# Patient Record
Sex: FEMALE | Race: BLACK OR AFRICAN AMERICAN | Employment: FULL TIME | URBAN - METROPOLITAN AREA
[De-identification: names, ages, dates, MRNs, and addresses within clinical notes are randomized per-mention and may not be internally consistent; named-entity substitution may affect disease eponyms.]

---

## 2019-10-18 ENCOUNTER — HOSPITAL ENCOUNTER (OUTPATIENT)
Dept: NON INVASIVE DIAGNOSTICS | Age: 69
Discharge: HOME | End: 2019-10-18
Attending: INTERNAL MEDICINE
Payer: COMMERCIAL

## 2019-10-18 VITALS
HEIGHT: 66 IN | SYSTOLIC BLOOD PRESSURE: 179 MMHG | WEIGHT: 279 LBS | BODY MASS INDEX: 44.84 KG/M2 | DIASTOLIC BLOOD PRESSURE: 102 MMHG

## 2019-10-18 DIAGNOSIS — I47.1 PAROXYSMAL SUPRAVENTRICULAR TACHYCARDIA (HCC): ICD-10-CM

## 2019-10-18 LAB
ECHO AO ASC DIAM: 2.88 CM
ECHO AO ROOT DIAM: 3.25 CM
ECHO AV AREA PEAK VELOCITY: 1.9 CM2
ECHO AV AREA VTI: 1.9 CM2
ECHO AV AREA/BSA PEAK VELOCITY: 0.8 CM2/M2
ECHO AV AREA/BSA VTI: 0.8 CM2/M2
ECHO AV MEAN GRADIENT: 4.4 MMHG
ECHO AV PEAK GRADIENT: 7.7 MMHG
ECHO AV PEAK VELOCITY: 139 CM/S
ECHO AV VTI: 27.71 CM
ECHO IVC SNIFF: 1.4 CM
ECHO LA MAJOR AXIS: 5 CM
ECHO LA VOL 2C: 106.72 ML (ref 22–52)
ECHO LA VOL 4C: 63.64 ML (ref 22–52)
ECHO LA VOL BP: 90.22 ML (ref 22–52)
ECHO LA VOL/BSA BIPLANE: 39.16 ML/M2 (ref 16–28)
ECHO LA VOLUME INDEX A2C: 46.32 ML/M2 (ref 16–28)
ECHO LA VOLUME INDEX A4C: 27.62 ML/M2 (ref 16–28)
ECHO LV E' LATERAL VELOCITY: 10 CM/S
ECHO LV E' SEPTAL VELOCITY: 5 CM/S
ECHO LV EDV A2C: 98.8 ML
ECHO LV EDV A4C: 131.6 ML
ECHO LV EDV BP: 116.4 ML (ref 56–104)
ECHO LV EDV INDEX A4C: 57.1 ML/M2
ECHO LV EDV INDEX BP: 50.5 ML/M2
ECHO LV EDV NDEX A2C: 42.9 ML/M2
ECHO LV EJECTION FRACTION A2C: 25 %
ECHO LV EJECTION FRACTION A4C: 26 %
ECHO LV EJECTION FRACTION BIPLANE: 25.5 % (ref 55–100)
ECHO LV ESV A2C: 74.3 ML
ECHO LV ESV A4C: 96.9 ML
ECHO LV ESV BP: 86.7 ML (ref 19–49)
ECHO LV ESV INDEX A2C: 32.2 ML/M2
ECHO LV ESV INDEX A4C: 42.1 ML/M2
ECHO LV ESV INDEX BP: 37.6 ML/M2
ECHO LV INTERNAL DIMENSION DIASTOLIC: 4.93 CM (ref 3.9–5.3)
ECHO LV INTERNAL DIMENSION SYSTOLIC: 4.82 CM
ECHO LV IVSD: 1.22 CM (ref 0.6–0.9)
ECHO LV MASS 2D: 296.4 G (ref 67–162)
ECHO LV MASS INDEX 2D: 128.6 G/M2 (ref 43–95)
ECHO LV POSTERIOR WALL DIASTOLIC: 1.32 CM (ref 0.6–0.9)
ECHO LVOT DIAM: 1.98 CM
ECHO LVOT PEAK GRADIENT: 3 MMHG
ECHO LVOT PEAK VELOCITY: 87.01 CM/S
ECHO LVOT VTI: 17.39 CM
ECHO MV A VELOCITY: 84.15 CM/S
ECHO MV AREA PHT: 4.3 CM2
ECHO MV E DECELERATION TIME (DT): 174.9 MS
ECHO MV E VELOCITY: 83.13 CM/S
ECHO MV E/A RATIO: 0.99
ECHO MV E/E' LATERAL: 8.31
ECHO MV E/E' RATIO (AVERAGED): 12.47
ECHO MV E/E' SEPTAL: 16.63
ECHO MV PRESSURE HALF TIME (PHT): 50.7 MS
ECHO RA AREA 4C: 16.82 CM2
ECHO RV INTERNAL DIMENSION: 4.52 CM
ECHO TRICUSPID ANNULAR PEAK SYSTOLIC VELOCITY: 2.6 CM/S
ECHO TV REGURGITANT MAX VELOCITY: 297.41 CM/S
ECHO TV REGURGITANT PEAK GRADIENT: 35.4 MMHG

## 2019-10-18 PROCEDURE — 93306 TTE W/DOPPLER COMPLETE: CPT

## 2020-01-22 RX ORDER — ASPIRIN 81 MG/1
81 TABLET ORAL DAILY
COMMUNITY

## 2020-01-22 RX ORDER — METOPROLOL SUCCINATE 25 MG/1
25 TABLET, EXTENDED RELEASE ORAL DAILY
COMMUNITY

## 2020-01-22 RX ORDER — SIMVASTATIN 20 MG/1
20 TABLET, FILM COATED ORAL
COMMUNITY

## 2020-01-22 RX ORDER — LOSARTAN POTASSIUM 25 MG/1
25 TABLET ORAL DAILY
COMMUNITY

## 2020-01-23 ENCOUNTER — HOSPITAL ENCOUNTER (OUTPATIENT)
Age: 70
Setting detail: OUTPATIENT SURGERY
Discharge: HOME | End: 2020-01-23
Attending: INTERNAL MEDICINE | Admitting: INTERNAL MEDICINE
Payer: COMMERCIAL

## 2020-01-23 VITALS
SYSTOLIC BLOOD PRESSURE: 124 MMHG | WEIGHT: 274.56 LBS | OXYGEN SATURATION: 100 % | HEART RATE: 63 BPM | HEIGHT: 65 IN | RESPIRATION RATE: 11 BRPM | DIASTOLIC BLOOD PRESSURE: 62 MMHG | TEMPERATURE: 97.4 F | BODY MASS INDEX: 45.74 KG/M2

## 2020-01-23 DIAGNOSIS — R94.39 ABNORMAL STRESS TEST: ICD-10-CM

## 2020-01-23 DIAGNOSIS — I20.9 ANGINA PECTORIS (HCC): ICD-10-CM

## 2020-01-23 LAB
ALBUMIN SERPL-MCNC: 4 G/DL (ref 3.4–5)
ALBUMIN/GLOB SERPL: 1.1 {RATIO} (ref 0.8–1.7)
ALP SERPL-CCNC: 78 U/L (ref 45–117)
ALT SERPL-CCNC: 18 U/L (ref 13–56)
ANION GAP SERPL CALC-SCNC: 6 MMOL/L (ref 3–18)
AST SERPL-CCNC: 17 U/L (ref 10–38)
ATRIAL RATE: 68 BPM
BASOPHILS # BLD: 0 K/UL (ref 0–0.1)
BASOPHILS NFR BLD: 0 % (ref 0–3)
BILIRUB SERPL-MCNC: 0.7 MG/DL (ref 0.2–1)
BUN SERPL-MCNC: 11 MG/DL (ref 7–18)
BUN/CREAT SERPL: 13 (ref 12–20)
CALCIUM SERPL-MCNC: 9.2 MG/DL (ref 8.5–10.1)
CALCULATED P AXIS, ECG09: 52 DEGREES
CALCULATED R AXIS, ECG10: -30 DEGREES
CALCULATED T AXIS, ECG11: 16 DEGREES
CHLORIDE SERPL-SCNC: 108 MMOL/L (ref 100–111)
CHOLEST SERPL-MCNC: 200 MG/DL
CO2 SERPL-SCNC: 29 MMOL/L (ref 21–32)
CREAT SERPL-MCNC: 0.84 MG/DL (ref 0.6–1.3)
DIAGNOSIS, 93000: NORMAL
DIFFERENTIAL METHOD BLD: ABNORMAL
EOSINOPHIL # BLD: 0.3 K/UL (ref 0–0.4)
EOSINOPHIL NFR BLD: 3 % (ref 0–5)
ERYTHROCYTE [DISTWIDTH] IN BLOOD BY AUTOMATED COUNT: 14.8 % (ref 11.6–14.5)
GLOBULIN SER CALC-MCNC: 3.7 G/DL (ref 2–4)
GLUCOSE SERPL-MCNC: 96 MG/DL (ref 74–99)
HCT VFR BLD AUTO: 43.5 % (ref 35–45)
HDLC SERPL-MCNC: 89 MG/DL (ref 40–60)
HDLC SERPL: 2.2 {RATIO} (ref 0–5)
HGB BLD-MCNC: 13.7 G/DL (ref 12–16)
INR PPP: 1.2 (ref 0.8–1.2)
LDLC SERPL CALC-MCNC: 95.8 MG/DL (ref 0–100)
LIPID PROFILE,FLP: ABNORMAL
LYMPHOCYTES # BLD: 2.5 K/UL (ref 0.8–3.5)
LYMPHOCYTES NFR BLD: 28 % (ref 20–51)
MAGNESIUM SERPL-MCNC: 2.2 MG/DL (ref 1.6–2.6)
MCH RBC QN AUTO: 27.6 PG (ref 24–34)
MCHC RBC AUTO-ENTMCNC: 31.5 G/DL (ref 31–37)
MCV RBC AUTO: 87.7 FL (ref 74–97)
MONOCYTES # BLD: 0.4 K/UL (ref 0–1)
MONOCYTES NFR BLD: 5 % (ref 2–9)
NEUTS SEG # BLD: 5.7 K/UL (ref 1.8–8)
NEUTS SEG NFR BLD: 64 % (ref 42–75)
P-R INTERVAL, ECG05: 164 MS
PLATELET # BLD AUTO: 280 K/UL (ref 135–420)
PLATELET COMMENTS,PCOM: ABNORMAL
PMV BLD AUTO: 11.3 FL (ref 9.2–11.8)
POTASSIUM SERPL-SCNC: 4 MMOL/L (ref 3.5–5.5)
PROT SERPL-MCNC: 7.7 G/DL (ref 6.4–8.2)
PROTHROMBIN TIME: 15 SEC (ref 11.5–15.2)
Q-T INTERVAL, ECG07: 410 MS
QRS DURATION, ECG06: 110 MS
QTC CALCULATION (BEZET), ECG08: 435 MS
RBC # BLD AUTO: 4.96 M/UL (ref 4.2–5.3)
RBC MORPH BLD: ABNORMAL
SODIUM SERPL-SCNC: 143 MMOL/L (ref 136–145)
TRIGL SERPL-MCNC: 76 MG/DL (ref ?–150)
VENTRICULAR RATE, ECG03: 68 BPM
VLDLC SERPL CALC-MCNC: 15.2 MG/DL
WBC # BLD AUTO: 8.9 K/UL (ref 4.6–13.2)
WBC MORPH BLD: ABNORMAL

## 2020-01-23 PROCEDURE — 77030004522 HC CATH ANGI DX EXPO BSC -A: Performed by: INTERNAL MEDICINE

## 2020-01-23 PROCEDURE — C1894 INTRO/SHEATH, NON-LASER: HCPCS | Performed by: INTERNAL MEDICINE

## 2020-01-23 PROCEDURE — 93005 ELECTROCARDIOGRAM TRACING: CPT

## 2020-01-23 PROCEDURE — 80061 LIPID PANEL: CPT

## 2020-01-23 PROCEDURE — C1887 CATHETER, GUIDING: HCPCS | Performed by: INTERNAL MEDICINE

## 2020-01-23 PROCEDURE — 83735 ASSAY OF MAGNESIUM: CPT

## 2020-01-23 PROCEDURE — C1769 GUIDE WIRE: HCPCS | Performed by: INTERNAL MEDICINE

## 2020-01-23 PROCEDURE — 80053 COMPREHEN METABOLIC PANEL: CPT

## 2020-01-23 PROCEDURE — 85610 PROTHROMBIN TIME: CPT

## 2020-01-23 PROCEDURE — 77030029997 HC DEV COM RDL R BND TELE -B: Performed by: INTERNAL MEDICINE

## 2020-01-23 PROCEDURE — 77030013797 HC KT TRNSDUC PRSSR EDWD -A: Performed by: INTERNAL MEDICINE

## 2020-01-23 PROCEDURE — 85025 COMPLETE CBC W/AUTO DIFF WBC: CPT

## 2020-01-23 PROCEDURE — 99152 MOD SED SAME PHYS/QHP 5/>YRS: CPT | Performed by: INTERNAL MEDICINE

## 2020-01-23 PROCEDURE — 77030012468 HC VLV BLEEDBK CNTRL ABBT -B: Performed by: INTERNAL MEDICINE

## 2020-01-23 PROCEDURE — 77030004521 HC CATH ANGI DX COOK -B: Performed by: INTERNAL MEDICINE

## 2020-01-23 PROCEDURE — 74011250636 HC RX REV CODE- 250/636: Performed by: INTERNAL MEDICINE

## 2020-01-23 PROCEDURE — 36415 COLL VENOUS BLD VENIPUNCTURE: CPT

## 2020-01-23 PROCEDURE — 93460 R&L HRT ART/VENTRICLE ANGIO: CPT | Performed by: INTERNAL MEDICINE

## 2020-01-23 PROCEDURE — 74011000250 HC RX REV CODE- 250: Performed by: INTERNAL MEDICINE

## 2020-01-23 PROCEDURE — 77030008543 HC TBNG MON PRSS MRTM -A: Performed by: INTERNAL MEDICINE

## 2020-01-23 PROCEDURE — 99153 MOD SED SAME PHYS/QHP EA: CPT | Performed by: INTERNAL MEDICINE

## 2020-01-23 PROCEDURE — C1751 CATH, INF, PER/CENT/MIDLINE: HCPCS | Performed by: INTERNAL MEDICINE

## 2020-01-23 PROCEDURE — 74011636320 HC RX REV CODE- 636/320: Performed by: INTERNAL MEDICINE

## 2020-01-23 PROCEDURE — 77030016699 HC CATH ANGI DX INFN1 CARD -A: Performed by: INTERNAL MEDICINE

## 2020-01-23 RX ORDER — MIDAZOLAM HYDROCHLORIDE 1 MG/ML
INJECTION, SOLUTION INTRAMUSCULAR; INTRAVENOUS AS NEEDED
Status: DISCONTINUED | OUTPATIENT
Start: 2020-01-23 | End: 2020-01-23 | Stop reason: HOSPADM

## 2020-01-23 RX ORDER — LIDOCAINE HYDROCHLORIDE 10 MG/ML
INJECTION INFILTRATION; PERINEURAL AS NEEDED
Status: DISCONTINUED | OUTPATIENT
Start: 2020-01-23 | End: 2020-01-23 | Stop reason: HOSPADM

## 2020-01-23 RX ORDER — GUAIFENESIN 100 MG/5ML
81 LIQUID (ML) ORAL
Status: DISCONTINUED | OUTPATIENT
Start: 2020-01-23 | End: 2020-01-23 | Stop reason: HOSPADM

## 2020-01-23 RX ORDER — SODIUM CHLORIDE 9 MG/ML
25 INJECTION, SOLUTION INTRAVENOUS CONTINUOUS
Status: DISCONTINUED | OUTPATIENT
Start: 2020-01-23 | End: 2020-01-23 | Stop reason: HOSPADM

## 2020-01-23 RX ORDER — FENTANYL CITRATE 50 UG/ML
INJECTION, SOLUTION INTRAMUSCULAR; INTRAVENOUS AS NEEDED
Status: DISCONTINUED | OUTPATIENT
Start: 2020-01-23 | End: 2020-01-23 | Stop reason: HOSPADM

## 2020-01-23 RX ORDER — SODIUM CHLORIDE 0.9 % (FLUSH) 0.9 %
5-40 SYRINGE (ML) INJECTION EVERY 8 HOURS
Status: DISCONTINUED | OUTPATIENT
Start: 2020-01-23 | End: 2020-01-23 | Stop reason: HOSPADM

## 2020-01-23 RX ORDER — HEPARIN SODIUM 1000 [USP'U]/ML
INJECTION, SOLUTION INTRAVENOUS; SUBCUTANEOUS AS NEEDED
Status: DISCONTINUED | OUTPATIENT
Start: 2020-01-23 | End: 2020-01-23 | Stop reason: HOSPADM

## 2020-01-23 RX ORDER — SODIUM CHLORIDE 0.9 % (FLUSH) 0.9 %
5-40 SYRINGE (ML) INJECTION AS NEEDED
Status: DISCONTINUED | OUTPATIENT
Start: 2020-01-23 | End: 2020-01-23 | Stop reason: HOSPADM

## 2020-01-23 RX ORDER — HEPARIN SODIUM 200 [USP'U]/100ML
INJECTION, SOLUTION INTRAVENOUS
Status: COMPLETED | OUTPATIENT
Start: 2020-01-23 | End: 2020-01-23

## 2020-01-23 RX ORDER — VERAPAMIL HYDROCHLORIDE 2.5 MG/ML
INJECTION, SOLUTION INTRAVENOUS AS NEEDED
Status: DISCONTINUED | OUTPATIENT
Start: 2020-01-23 | End: 2020-01-23 | Stop reason: HOSPADM

## 2020-01-23 RX ADMIN — SODIUM CHLORIDE 25 ML/HR: 900 INJECTION, SOLUTION INTRAVENOUS at 08:18

## 2020-01-23 NOTE — PROGRESS NOTES
Sheath removed from left groin area,  2x2 and tegaderm applied to site,  Tr band removed from left wrist area, site covered with 2x2 and Tegaderm  .   Neuro/vasc assessment of left arm and hand WNL

## 2020-01-23 NOTE — PROGRESS NOTES
1/23/2020      RE: Veronica Casas      To Whom it May Concern: This is to certify that Veronica Casas may return to work Monday 1/27/2020      Please feel free to contact my office if you have any questions or concerns. Thank you for your assistance in this matter. Sincerely,      Dr Lore ivory.  Arnold Andrew RN

## 2020-01-23 NOTE — Clinical Note
TRANSFER - OUT REPORT:     Verbal report given to: Tiffany Joyce. Report consisted of patient's Situation, Background, Assessment and   Recommendations(SBAR). Opportunity for questions and clarification was provided. Patient transported to: care unit.

## 2020-01-23 NOTE — PROGRESS NOTES
Cardiology Progress Note        Patient: Adriana Coe        Sex: female          DOA: 1/23/2020  YOB: 1950      Age:  81 y.o.        LOS:  LOS: 0 days   Assessment/Plan   Date of Surgery Update:  Adriana Coe was seen and examined. History and physical has been reviewed. The patient has been examined.  There have been no significant clinical changes since the completion of the originally dated History and Physical.    Signed By: Surnider Atkinson MD     January 23, 2020 9:51 AM             Signed By: Surinder Atkinson MD     January 23, 2020

## 2020-01-23 NOTE — PROGRESS NOTES
Pt up dressed self. To bathroom and voided without incident,  Discharge inst given and reviewed with pt and daughter, both verbalize understanding. Discharged via wheelchair to car in care of daughter,  Denies any pain or distress at time of discharge, arm bands removed and shredded.

## 2020-01-23 NOTE — DISCHARGE INSTRUCTIONS
DISCHARGE SUMMARY from Nurse    PATIENT INSTRUCTIONS:    After general anesthesia or intravenous sedation, for 24 hours or while taking prescription Narcotics:  · Limit your activities  · Do not drive and operate hazardous machinery  · Do not make important personal or business decisions  · Do  not drink alcoholic beverages  · If you have not urinated within 8 hours after discharge, please contact your surgeon on call. Report the following to your surgeon:  · Excessive pain, swelling, redness or odor of or around the surgical area  · Temperature over 100.5  · Nausea and vomiting lasting longer than 4 hours or if unable to take medications  · Any signs of decreased circulation or nerve impairment to extremity: change in color, persistent  numbness, tingling, coldness or increase pain  · Any questions    What to do at Home:  Recommended activity: Activity as tolerated and no driving for today,     *  Please give a list of your current medications to your Primary Care Provider. *  Please update this list whenever your medications are discontinued, doses are      changed, or new medications (including over-the-counter products) are added. *  Please carry medication information at all times in case of emergency situations. These are general instructions for a healthy lifestyle:    No smoking/ No tobacco products/ Avoid exposure to second hand smoke  Surgeon General's Warning:  Quitting smoking now greatly reduces serious risk to your health. Obesity, smoking, and sedentary lifestyle greatly increases your risk for illness    A healthy diet, regular physical exercise & weight monitoring are important for maintaining a healthy lifestyle    You may be retaining fluid if you have a history of heart failure or if you experience any of the following symptoms:  Weight gain of 3 pounds or more overnight or 5 pounds in a week, increased swelling in our hands or feet or shortness of breath while lying flat in bed. Please call your doctor as soon as you notice any of these symptoms; do not wait until your next office visit. The discharge information has been reviewed with the patient. The patient verbalized understanding. Discharge medications reviewed with the patient and appropriate educational materials and side effects teaching were provided. ___________________________________________________________________________________________________________________________________                 Cardiac Catheterization/Angiography Discharge Instructions    *Check the puncture site frequently for swelling or bleeding. If you see any bleeding, lie down and apply pressure over the area with a clean towel or washcloth. Notify your doctor for any redness, swelling, drainage or oozing from the puncture site. Notify your doctor for any fever or chills. *If the leg or arm with the puncture becomes cold, numb or painful,  Go to the emergency room. *Activity should be limited for the next 24 hours. Climb stairs as little as possible and avoid any stooping, bending or strenuous activity for 24 hours. No heavy lifting (anything over 10 pounds) for five days. *Do not drive for 24 hours. *You may resume your usual diet. Drink more fluids than usual.    *Have a responsible person drive you home and stay with you for at least 24 hours after your heart catheterization/angiography. *You may remove the bandage from your Right Groin and arm in 24 hours. You may shower in 24 hours. No tub baths, hot tubs or swimming for one week. Do not place any lotions, creams, powders, ointments over the puncture site for one week. You may place a clean band-aid over the puncture site each day for 5 days. Change this daily.     May return to work Monday 1/27/2020

## 2020-01-23 NOTE — ROUTINE PROCESS
Cardiac Cath Lab:  Pre Procedure Chart Check Patients chart was accessed and reviewed for possible and/or scheduled procedure. Creatinine Clearance: 
Serum creatinine: 0.84 mg/dL 01/23/20 0900 Estimated creatinine clearance: 85.5 mL/min Total Contrast  Load: 
3 x estimated clearance amount=  256.5ml 
 
75% of Contrast Load: 0.75 x Total Contrast Load=    192.3ml Recent Labs  
  01/23/20 
0900 01/23/20 
0815 WBC  --  8.9  
RBC  --  4.96  
HCT  --  43.5 HGB  --  13.7 PLT  --  280 INR  --  1.2 PTP  --  15.0   --   
K 4.0  --   
BUN 11  --   
CREA 0.84  --   
GFRAA >60  --   
GFRNA >60  --   
CA 9.2  --   
 
 
BMI: Body mass index is 46.4 kg/m². ALLERGIES:  
Allergies Allergen Reactions  Pcn [Penicillins] Hives Lines: 
  
  
Peripheral IV 01/23/20 Left;Posterior Hand (Active) Site Assessment Clean, dry, & intact 1/23/2020  8:19 AM  
Phlebitis Assessment 0 1/23/2020  8:19 AM  
Dressing Status Clean, dry, & intact 1/23/2020  8:19 AM  
Dressing Type Transparent 1/23/2020  8:19 AM  
Hub Color/Line Status Blue 1/23/2020  8:19 AM  
 
  
 
History: 
 
Past Medical History:  
Diagnosis Date  CAD (coronary artery disease)   
 cardiomophathy  CAD (coronary artery disease)   
 svt  Cancer [facility])   
 colon adenoma  Cancer ([facility] [address] lympoma  Ill-defined condition   
 pulmonary hypertension  Liver disease   
 pulmonary hypertension Past Surgical History:  
Procedure Laterality Date  HX GYN There is no problem list on file for this patient.

## 2020-01-23 NOTE — Clinical Note
TRANSFER - IN REPORT:     Verbal report received from: rn.     Report consisted of patient's Situation, Background, Assessment and   Recommendations(SBAR). Opportunity for questions and clarification was provided. Assessment completed upon patient's arrival to unit and care assumed. Patient transported with a Cardiac Cath Tech / Patient Care Tech.

## 2021-12-10 RX ORDER — SPIRONOLACTONE 25 MG/1
25 TABLET ORAL DAILY
COMMUNITY

## 2021-12-10 RX ORDER — FUROSEMIDE 40 MG/1
40 TABLET ORAL DAILY
COMMUNITY

## 2021-12-13 ENCOUNTER — HOSPITAL ENCOUNTER (OUTPATIENT)
Age: 71
Setting detail: OUTPATIENT SURGERY
Discharge: HOME | End: 2021-12-13
Attending: INTERNAL MEDICINE | Admitting: INTERNAL MEDICINE
Payer: MEDICARE

## 2021-12-13 VITALS
TEMPERATURE: 98.6 F | HEART RATE: 83 BPM | WEIGHT: 256.4 LBS | OXYGEN SATURATION: 100 % | RESPIRATION RATE: 16 BRPM | HEIGHT: 66 IN | DIASTOLIC BLOOD PRESSURE: 71 MMHG | SYSTOLIC BLOOD PRESSURE: 127 MMHG | BODY MASS INDEX: 41.21 KG/M2

## 2021-12-13 DIAGNOSIS — I27.20 PULMONARY HYPERTENSION (HCC): ICD-10-CM

## 2021-12-13 DIAGNOSIS — I50.9 CONGESTIVE HEART FAILURE, UNSPECIFIED HF CHRONICITY, UNSPECIFIED HEART FAILURE TYPE (HCC): ICD-10-CM

## 2021-12-13 DIAGNOSIS — I42.9 CARDIOMYOPATHY, UNSPECIFIED TYPE (HCC): ICD-10-CM

## 2021-12-13 LAB
ATRIAL RATE: 96 BPM
CALCULATED P AXIS, ECG09: 47 DEGREES
CALCULATED R AXIS, ECG10: 14 DEGREES
CALCULATED T AXIS, ECG11: 10 DEGREES
DIAGNOSIS, 93000: NORMAL
ERYTHROCYTE [DISTWIDTH] IN BLOOD BY AUTOMATED COUNT: 20.3 % (ref 11.6–14.5)
HCT VFR BLD AUTO: 40.8 % (ref 35–45)
HGB BLD-MCNC: 12.4 G/DL (ref 12–16)
INR PPP: 1.3 (ref 0.8–1.2)
MCH RBC QN AUTO: 24.6 PG (ref 24–34)
MCHC RBC AUTO-ENTMCNC: 30.4 G/DL (ref 31–37)
MCV RBC AUTO: 81 FL (ref 78–100)
NRBC # BLD: 0 K/UL (ref 0–0.01)
NRBC BLD-RTO: 0 PER 100 WBC
P-R INTERVAL, ECG05: 176 MS
PLATELET # BLD AUTO: 247 K/UL (ref 135–420)
PMV BLD AUTO: 10.7 FL (ref 9.2–11.8)
PROTHROMBIN TIME: 15.8 SEC (ref 11.5–15.2)
Q-T INTERVAL, ECG07: 392 MS
QRS DURATION, ECG06: 128 MS
QTC CALCULATION (BEZET), ECG08: 495 MS
RBC # BLD AUTO: 5.04 M/UL (ref 4.2–5.3)
VENTRICULAR RATE, ECG03: 96 BPM
WBC # BLD AUTO: 8 K/UL (ref 4.6–13.2)

## 2021-12-13 PROCEDURE — 85027 COMPLETE CBC AUTOMATED: CPT

## 2021-12-13 PROCEDURE — 93460 R&L HRT ART/VENTRICLE ANGIO: CPT | Performed by: INTERNAL MEDICINE

## 2021-12-13 PROCEDURE — 74011250637 HC RX REV CODE- 250/637: Performed by: INTERNAL MEDICINE

## 2021-12-13 PROCEDURE — 77030016699 HC CATH ANGI DX INFN1 CARD -A: Performed by: INTERNAL MEDICINE

## 2021-12-13 PROCEDURE — 77030008543 HC TBNG MON PRSS MRTM -A: Performed by: INTERNAL MEDICINE

## 2021-12-13 PROCEDURE — 77030004514: Performed by: INTERNAL MEDICINE

## 2021-12-13 PROCEDURE — 77030013797 HC KT TRNSDUC PRSSR EDWD -A: Performed by: INTERNAL MEDICINE

## 2021-12-13 PROCEDURE — 74011250636 HC RX REV CODE- 250/636: Performed by: INTERNAL MEDICINE

## 2021-12-13 PROCEDURE — 85610 PROTHROMBIN TIME: CPT

## 2021-12-13 PROCEDURE — 99152 MOD SED SAME PHYS/QHP 5/>YRS: CPT | Performed by: INTERNAL MEDICINE

## 2021-12-13 PROCEDURE — 74011000250 HC RX REV CODE- 250: Performed by: INTERNAL MEDICINE

## 2021-12-13 PROCEDURE — C1769 GUIDE WIRE: HCPCS | Performed by: INTERNAL MEDICINE

## 2021-12-13 PROCEDURE — 93005 ELECTROCARDIOGRAM TRACING: CPT

## 2021-12-13 PROCEDURE — C1894 INTRO/SHEATH, NON-LASER: HCPCS | Performed by: INTERNAL MEDICINE

## 2021-12-13 PROCEDURE — 77030004522 HC CATH ANGI DX EXPO BSC -A: Performed by: INTERNAL MEDICINE

## 2021-12-13 PROCEDURE — 74011000636 HC RX REV CODE- 636: Performed by: INTERNAL MEDICINE

## 2021-12-13 PROCEDURE — C1751 CATH, INF, PER/CENT/MIDLINE: HCPCS | Performed by: INTERNAL MEDICINE

## 2021-12-13 PROCEDURE — 77030004566 HC CATH ANGI DX TORCON COOK -B: Performed by: INTERNAL MEDICINE

## 2021-12-13 PROCEDURE — 99153 MOD SED SAME PHYS/QHP EA: CPT | Performed by: INTERNAL MEDICINE

## 2021-12-13 RX ORDER — LIDOCAINE HYDROCHLORIDE 10 MG/ML
INJECTION INFILTRATION; PERINEURAL AS NEEDED
Status: DISCONTINUED | OUTPATIENT
Start: 2021-12-13 | End: 2021-12-13 | Stop reason: HOSPADM

## 2021-12-13 RX ORDER — FENTANYL CITRATE 50 UG/ML
INJECTION, SOLUTION INTRAMUSCULAR; INTRAVENOUS AS NEEDED
Status: DISCONTINUED | OUTPATIENT
Start: 2021-12-13 | End: 2021-12-13 | Stop reason: HOSPADM

## 2021-12-13 RX ORDER — MIDAZOLAM HYDROCHLORIDE 1 MG/ML
INJECTION, SOLUTION INTRAMUSCULAR; INTRAVENOUS AS NEEDED
Status: DISCONTINUED | OUTPATIENT
Start: 2021-12-13 | End: 2021-12-13 | Stop reason: HOSPADM

## 2021-12-13 RX ORDER — HEPARIN SODIUM 1000 [USP'U]/ML
INJECTION, SOLUTION INTRAVENOUS; SUBCUTANEOUS AS NEEDED
Status: DISCONTINUED | OUTPATIENT
Start: 2021-12-13 | End: 2021-12-13 | Stop reason: HOSPADM

## 2021-12-13 RX ORDER — SODIUM CHLORIDE 0.9 % (FLUSH) 0.9 %
5-40 SYRINGE (ML) INJECTION EVERY 8 HOURS
Status: CANCELLED | OUTPATIENT
Start: 2021-12-13

## 2021-12-13 RX ORDER — GUAIFENESIN 100 MG/5ML
81 LIQUID (ML) ORAL
Status: DISCONTINUED | OUTPATIENT
Start: 2021-12-13 | End: 2021-12-13

## 2021-12-13 RX ORDER — HEPARIN SODIUM 200 [USP'U]/100ML
INJECTION, SOLUTION INTRAVENOUS
Status: COMPLETED | OUTPATIENT
Start: 2021-12-13 | End: 2021-12-13

## 2021-12-13 RX ORDER — VERAPAMIL HYDROCHLORIDE 2.5 MG/ML
INJECTION, SOLUTION INTRAVENOUS AS NEEDED
Status: DISCONTINUED | OUTPATIENT
Start: 2021-12-13 | End: 2021-12-13 | Stop reason: HOSPADM

## 2021-12-13 RX ORDER — THERA TABS 400 MCG
1 TAB ORAL DAILY
COMMUNITY

## 2021-12-13 RX ORDER — GUAIFENESIN 100 MG/5ML
81 LIQUID (ML) ORAL
Status: COMPLETED | OUTPATIENT
Start: 2021-12-13 | End: 2021-12-13

## 2021-12-13 RX ORDER — SODIUM CHLORIDE 0.9 % (FLUSH) 0.9 %
5-40 SYRINGE (ML) INJECTION AS NEEDED
Status: CANCELLED | OUTPATIENT
Start: 2021-12-13

## 2021-12-13 RX ORDER — SODIUM CHLORIDE 9 MG/ML
10 INJECTION, SOLUTION INTRAVENOUS ONCE
Status: COMPLETED | OUTPATIENT
Start: 2021-12-13 | End: 2021-12-13

## 2021-12-13 RX ADMIN — ASPIRIN 81 MG: 81 TABLET, CHEWABLE ORAL at 08:34

## 2021-12-13 RX ADMIN — SODIUM CHLORIDE 10 ML/HR: 9 INJECTION, SOLUTION INTRAVENOUS at 08:22

## 2021-12-13 NOTE — BRIEF OP NOTE
Brief Postoperative Note    Patient: Adwoa Fountain  YOB: 1950  MRN: [id number]    Date of Procedure: 12/13/2021     Pre-Op Diagnosis: Congestive heart failure, unspecified HF chronicity, unspecified heart failure type ([address] [I50.9]  Cardiomyopathy, unspecified type ([address] [I42.9]  Pulmonary hypertension ([address] [I27.20]    Post-Op Diagnosis: Same as preoperative diagnosis. Procedure(s):  LEFT AND RIGHT HEART CATH / CORONARY ANGIOGRAPHY    Surgeon(s):  Jeanne Heard MD    Surgical Assistant: None    Anesthesia: Con-Sed     Estimated Blood Loss (mL): less than 50     Complications: None    Specimens: * No specimens in log *     Implants: * No implants in log *    Drains: * No LDAs found *    Findings: Mild luminal irregularities in the coronary arteries otherwise patent. Elevated right-sided pressure, pulmonary hypertension, group 2. Complete report to follow.   Discussed with patient and family    Electronically Signed by Terrence Terrazas MD on 12/13/2021 at 11:22 AM

## 2021-12-13 NOTE — PROGRESS NOTES
[address] December 13, 2021       RE: Zuhair Romeo      To Whom It May Concern,    This is to certify that Zuhair Romeo may may return to work on Wednesday 12/15/21. Please feel free to contact Dr. Andres Self office at [phone] if you have any questions or concerns. Thank you for your assistance in this matter.       Sincerely,        Eduin Ovalles RN

## 2021-12-13 NOTE — DISCHARGE INSTRUCTIONS
DISCHARGE SUMMARY from Nurse    PATIENT INSTRUCTIONS:    After general anesthesia or intravenous sedation, for 24 hours or while taking prescription Narcotics:  · Limit your activities  · Do not drive and operate hazardous machinery  · Do not make important personal or business decisions  · Do  not drink alcoholic beverages  · If you have not urinated within 8 hours after discharge, please contact your surgeon on call. Report the following to your surgeon:  · Excessive pain, swelling, redness or odor of or around the surgical area  · Temperature over 100.5  · Nausea and vomiting lasting longer than 4 hours or if unable to take medications  · Any signs of decreased circulation or nerve impairment to extremity: change in color, persistent  numbness, tingling, coldness or increase pain  · Any questions    What to do at Home:  Recommended activity: No lifting, Driving, or Strenuous exercise for 3 days      *  Please give a list of your current medications to your Primary Care Provider. *  Please update this list whenever your medications are discontinued, doses are      changed, or new medications (including over-the-counter products) are added. *  Please carry medication information at all times in case of emergency situations. These are general instructions for a healthy lifestyle:    No smoking/ No tobacco products/ Avoid exposure to second hand smoke  Surgeon General's Warning:  Quitting smoking now greatly reduces serious risk to your health.     Obesity, smoking, and sedentary lifestyle greatly increases your risk for illness    A healthy diet, regular physical exercise & weight monitoring are important for maintaining a healthy lifestyle    You may be retaining fluid if you have a history of heart failure or if you experience any of the following symptoms:  Weight gain of 3 pounds or more overnight or 5 pounds in a week, increased swelling in our hands or feet or shortness of breath while lying flat in bed. Please call your doctor as soon as you notice any of these symptoms; do not wait until your next office visit. The discharge information has been reviewed with the patient and caregiver. The patient and caregiver verbalized understanding. Discharge medications reviewed with the patient and caregiver and appropriate educational materials and side effects teaching were provided. ___________________________________________________________________________________________________________________________________                 Cardiac Catheterization/Angiography Discharge Instructions    *Check the puncture site frequently for swelling or bleeding. If you see any bleeding, lie down and apply pressure over the area with a clean town or washcloth. Notify your doctor for any redness, swelling, drainage or oozing from the puncture site. Notify your doctor for any fever or chills. *If the leg or arm with the puncture becomes cold, numb or painful, call Dr Leonidas Grissom at  [phone]    *Activity should be limited for the next 36 hours. Climb stairs as little as possible and avoid any stooping, bending or strenuous activity for 48 hours. No heavy lifting (anything over 10 pounds) for three days. *Do not drive for 36 hours. *You may resume your usual diet. Drink more fluids than usual.    *Have a responsible person drive you home and stay with you for at least 24 hours after your heart catheterization/angiography. *You may remove the bandage from your Right, Left and Arm in 24 hours. You may shower in 24 hours. No tub baths, hot tubs or swimming for one week. Do not place any lotions, creams, powders, ointments over the puncture site for one week. You may place a clean band-aid over the puncture site each day for 5 days. Change this daily.       Patient armband removed and shredded

## 2021-12-13 NOTE — Clinical Note
Contrast Dose Calculator:   Patient's age: 73.   Patient's sex: Female. Patient weight (kg) = 116.3. Creatinine level (mg/dL) = 0.94. Creatinine clearance (mL/min): 104. Max Contrast dose per Creatinine Cl calculator = 234 mL.

## 2021-12-13 NOTE — PROGRESS NOTES
Pt prepped and ready for procedure. 0945 - EKG done    0950 - To Cath lab for R and L heart cath. 1122 - Returned to room post cath. Report received. TR Band to R radial in place. TR Band to Left Radial in place. Sheath in place in R brachial vein. All sites without signs of complications. Pt educated on calling for bleeding, numbness and not to put pressure on either arm. Taking PO fluids. 1215 - eating box lunch. Tolerating well.    1300 - began releasing TR Bands per protocol. Right brachial sheath removed. Pressure held x 10 minutes. Dressed with gauze and tegaderm. No signs of complications. 1345 - Right TR Band release completed. Site without signs of complications. Explained that the armboard would need to stay in place for 24 hours. Instructed on care of the sites. 1400 - Left TR Band release complete. Site without signs of complications. 1500 - ambulated to BR to void. Adelso well. Helped pt get dressed. 1515 - Discharge instructions reviewed with pt. And her daughter. Denies c/o pain. Dressings to all 3 puncture sites D/I without signs of complications. Discharged to home in stable condition with daughter. Left via WC.

## 2021-12-13 NOTE — PROGRESS NOTES
Cardiology Progress Note        Patient: Mikki Bañuelos        Sex: female          DOA: 12/13/2021  YOB: 1950      Age:  73 y.o.        LOS:  LOS: 0 days   Assessment/Plan   Date of Surgery Update:  Mikki Bañuelos was seen and examined. History and physical has been reviewed. The patient has been examined.  There have been no significant clinical changes since the completion of the originally dated History and Physical.    Signed By: Ingrid Blackmon MD     December 13, 2021 9:57 AM

## 2021-12-13 NOTE — Clinical Note
TRANSFER - IN REPORT:     Verbal report received from: rn.     Report consisted of patient's Situation, Background, Assessment and   Recommendations(SBAR). Opportunity for questions and clarification was provided. Assessment completed upon patient's arrival to unit and care assumed. Patient transported with a Registered Nurse and [address] [place] [address] / [facility] [place].

## 2022-12-03 RX ORDER — SODIUM CHLORIDE 0.9 % (FLUSH) 0.9 %
5-40 SYRINGE (ML) INJECTION AS NEEDED
Status: CANCELLED | OUTPATIENT
Start: 2022-12-03

## 2022-12-03 RX ORDER — ATROPINE SULFATE 0.1 MG/ML
0.5 INJECTION INTRAVENOUS
Status: CANCELLED | OUTPATIENT
Start: 2022-12-03 | End: 2022-12-04

## 2022-12-03 RX ORDER — DEXTROMETHORPHAN/PSEUDOEPHED 2.5-7.5/.8
1.2 DROPS ORAL
Status: CANCELLED | OUTPATIENT
Start: 2022-12-03

## 2022-12-03 RX ORDER — SODIUM CHLORIDE 0.9 % (FLUSH) 0.9 %
5-40 SYRINGE (ML) INJECTION EVERY 8 HOURS
Status: CANCELLED | OUTPATIENT
Start: 2022-12-03

## 2022-12-03 RX ORDER — DIPHENHYDRAMINE HYDROCHLORIDE 50 MG/ML
50 INJECTION, SOLUTION INTRAMUSCULAR; INTRAVENOUS ONCE
Status: CANCELLED | OUTPATIENT
Start: 2022-12-03 | End: 2022-12-03

## 2022-12-03 RX ORDER — EPINEPHRINE 0.1 MG/ML
1 INJECTION INTRACARDIAC; INTRAVENOUS
Status: CANCELLED | OUTPATIENT
Start: 2022-12-03 | End: 2022-12-04

## 2022-12-03 NOTE — H&P
Assessment/Plan  # Detail Type Description    1. Assessment Personal history of colonic polyps (Z86.010). Impression Pt of Dr. Agustina Jalloh, here for 3yr Recall, for surveillance of colonic adenomas, last addressed in 2019.  _________________________________  *Last colonoscopy was done on 4/23/2019 by Dr. Teddy Hill, history of adenomas: 1) One cecal polyp removed with snare (Tubular Adenoma). 2) One ascending polyp removed with snare (Tubular Adenoma). 3) One transverse colon polyp removed with cold biopsy (Tubular Adenoma). 4) Diverticulosis. 5) Internal hemorrhoids. 3yr Recall recommended. *C-scope History (of records that we have on file):  2006, hx of polyps - Dr. Fischer Cutting: Negative exam, 7yr Recall  2016, hx of polyps - Dr. Perry Macias: 2x Tubular Adenomas, 3yr Recall  2019, hx of adenomas - Dr. Perry Macias: 2x Tubular Adenomas, 1x HP, 3yr Recall     -Some additional old records unavailable at this time, any records recovered will be scanned to chart at later date.  _________________________________  Average risk, no FHx of colon cancer. Asymptomatic of GI complaints. BM: 2/day. Patient Plan *C-scope Plan:  Colonoscopy ordered with Dr. Denita Wyman  (@[facility] d/t: BMI of 44.95, Advancing Age, comorbidities)with PlenVu bowel prep (kit provided), and Miralax and stool softeners starting 3 days before prep.  -Patient is advised that they should take their aspirin (if prescribed) up until the day of procedure.  -Patient is advised to take Thyroid meds, BP meds, beta blockers, and any cardiac meds the AM of procedure with sip clear liquid after prep. *C-scope Risks:  Stressed importance of following all bowel preparation instructions. Explained the procedure to the patient including all risks and benefits. These risks consist of missed lesions on exam, bleeding, and bowel perforation with possible need for admission to the hospital, and in the most extensive of  circumstances, the patient may require surgery.  Pt verbalized understanding of these risks and is agreeable with this procedure. Plan Orders Further diagnostic evaluations ordered today include(s) DIAGNOSTIC COLONOSCOPY to be performed. She will be scheduled for GASTROENTEROLOGY PROCEDURE, Next Lab Date is within 1 Month on 12/06/2022. Clinical information/comments: at location [facility]. The surgeon scheduled is Anna Cardoso MD. An assistant has not been requested. 2. Assessment Nonischemic cardiomyopathy (I42.8). Impression Hx of Cardiomyopathy - Stable from symptom standpoint w/Decreased EF on Echos. Echo on 11/11/2022, EF: 25-30% (Improving), EF dropped to 15-20% in November 2021. Followed by Dr. Jurgen Ribeiro (Cardiologist), recommended AICD placement (patient wanted to wait on this). Hx of SVT, Pulmonary HTN, and Combined CHF - Euvolemic  Fluid restriction of 60oz/day  Taking Lasix, Aldactone, and Metoprolol. 3. Assessment Chronic combined systolic and diastolic congestive heart failure, NYHA Class I (I50.42). Impression See Above. 4. Assessment Supraventricular tachycardia (I47.1). Impression See Above. 5. Assessment Pulmonary hypertension, unspecified (I27.20). Impression Followed by Dr. John Bustos (Lung & Sleep) for this and JUSTINO (on CPAP), last seen on 11/4/2022.         6. Assessment Body mass index (BMI) 45.0-49.9, adult (Q34.93). Impression BMI: 44.95, Pt is Morbidly obese w/Short Frame, Hx of JUSTINO (on CPAP), and Pulmonary HTN. These factors increase risks of procedural complications with sedation, and compromise airway maintenance. Special attention to airway management in a hospital setting. Patient Plan The patient's significant comorbidities make for a high endoscopic risk group. [facility] setting required for all GI endoscopic procedures. This 81year old  patient was referred by Thad Lang. This 81year old female presents for Hx polyp/colon cancer.     History of Present Illness  1. Hx polyp/colon cancer   Prior screening:  colonoscopy. Denies risk factors. Pertinent negatives include abdominal pain, change in bowel habits, constipation, decreased appetite, diarrhea, melena, nausea, vomiting and weight loss. Additional information: No family history of colon cancer and last colonoscopy 2019   [facility] [place]  Hx adenoma  3 yrs recall . Bm 2 x daily. 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   Postmenopausal age: 42. Type of menopause is chemo induced .       Family History   (Detailed)    Relationship Family Member Name  Age at Death Condition Onset Age Cause of Death   Father  N  Stroke 76 N   Father    Kidney failure  N   Maternal grandmother  N  Myocardial infarction 43 N   Mother  Y 66 Small Cell lung cancer  Y   Mother  Y    N   Mother    Gout  N   Mother    Hypertension  N   Mother  N  Hyperlipidemia  N   Family History Comments  Relationship Family Member Name Condition Comments   Maternal grandmother  Myocardial infarction    Mother Hyperlipidemia      Social History  (Detailed)  Tobacco use reviewed. Preferred language is [place]. Marital Status/Family/Social Support  Marital status: Single     Tobacco use status: Never smoked tobacco.    Smoking status: Never smoker. Tobacco Screening  Patient has never used tobacco. Patient has not used tobacco in the last 30 days. Patient has not used smokeless tobacco in the last 30 days. Smoking Status  Type Smoking Status Usage Per Day Years Used Pack Years Total Pack Years    Never smoker         Alcohol  There is no history of alcohol use. Caffeine  The patient uses caffeine: coffee and tea. .    Lifestyle  Sedentary activity level. Never exercises. Diet  high calorie. Medications (active prior to today)  Medication Instructions Start Date Stop Date Refilled Elsewhere   Aspirin Low Dose 81 mg tablet,delayed release take 1 tablet by oral route  every day 10/21/2019   N   spironolactone 25 mg tablet take 1 tablet by oral route  every day 2022 N   metoprolol succinate ER 25 mg tablet,extended release 24 hr take 1 tablet by oral route  every day 2022 N   losartan 25 mg tablet take 1 tablet by oral route  every day 2022 N   FUROSEMIDE 40 MG TABLET take 2 tablets by mouth once daily AND MAY REPEAT LATER IN THE DAY AS NEEDED 2022 N   SIMVASTATIN 20 MG TABLET take 1 tablet by mouth every evening 10/25/2022  10/25/2022 N       Medication Reconciliation  Medications reconciled today.     Medication Reviewed  Adherence Medication Name Sig Desc Elsewhere Status   taking as directed Aspirin Low Dose 81 mg tablet,delayed release take 1 tablet by oral route  every day N Verified   taking as directed metoprolol succinate ER 25 mg tablet,extended release 24 hr take 1 tablet by oral route  every day N Verified   taking as directed spironolactone 25 mg tablet take 1 tablet by oral route  every day N Verified   taking as directed FUROSEMIDE 40 MG TABLET take 2 tablets by mouth once daily AND MAY REPEAT LATER IN THE DAY AS NEEDED N Verified   taking as directed losartan 25 mg tablet take 1 tablet by oral route  every day N Verified   taking as directed SIMVASTATIN 20 MG TABLET take 1 tablet by mouth every evening N Verified     Medications (Added, Continued or Stopped today)  Start Date Medication Directions PRN Status PRN Reason Instruction Stop Date   10/21/2019 Aspirin Low Dose 81 mg tablet,delayed release take 1 tablet by oral route  every day N      06/20/2022 FUROSEMIDE 40 MG TABLET take 2 tablets by mouth once daily AND MAY REPEAT LATER IN THE DAY AS NEEDED N      05/20/2022 losartan 25 mg tablet take 1 tablet by oral route  every day N      04/13/2022 metoprolol succinate ER 25 mg tablet,extended release 24 hr take 1 tablet by oral route  every day N      10/25/2022 SIMVASTATIN 20 MG TABLET take 1 tablet by mouth every evening N      04/13/2022 spironolactone 25 mg tablet take 1 tablet by oral route  every day N        Allergies  Ingredient Reaction (Severity) Medication Name Comment   PENICILLINS Rash       Reviewed, no changes. Review of Systems  System Neg/Pos Details   Constitutional Negative Chills, Fever, Malaise and Weight loss. ENMT Negative Ear infections, Nasal congestion, Sinus Infection and Sore throat. Eyes Negative Double vision and Eye pain. Respiratory Negative Asthma, Chronic cough, Dyspnea, Pleuritic pain and Wheezing. Cardio Negative Chest pain, Edema and Irregular heartbeat/palpitations. GI Negative Abdominal pain, Change in bowel habits, Constipation, Decreased appetite, Diarrhea, Dysphagia, Heartburn, Hematemesis, Hematochezia, Melena, Nausea, Reflux and Vomiting.  Negative Dysuria, Hematuria, Urinary frequency, Urinary incontinence and Urinary retention.    Endocrine Negative Cold intolerance, Heat intolerance and Increased thirst.   Neuro Negative Dizziness, Headache, Numbness, Tremors and Vertigo. Psych Negative Anxiety, Depression and Increased stress. Integumentary Negative Hives, Pruritus and Rash. MS Negative Back pain, Joint pain and Myalgia. Hema/Lymph Negative Easy bleeding, Easy bruising and Lymphadenopathy. Reproductive Positive The patient is post-menopausal (Occurred at age 42. The menopause was chemo induced). Reproductive Negative Breast lumps, Breast pain and Vaginal discharge. Vital Signs   Gynecologic History  Patient is postmenopausal.      Height  Time ft in cm Last Measured Height Position   8:22 AM 5.0 4.50 163.83 11/15/2022 0   8:19 AM    11/15/2022      Weight/BSA/BMI  Time lb oz kg Context BMI kg/m2 BSA m2   8:22 .00  120.656  44.95    8:19 AM    dressed with shoes       Date/Time Temp Pulse BP Cardiac Rhythm [facility]   12/06/22 0945 97.6 °F (36.4 °C) 78 140/69 Abnormal  -- CS     Respiratory Therapy (last day)    Date/Time Resp SpO2 O2 Device O2 Flow Rate (L/min) [facility]   12/06/22 0945 16 100 % None (Room air) -- CS     Physical  Exam  Exam Findings Details   Female GI Quick Visit Comments Morbidly obese w/Short Frame   Constitutional Normal Well developed. Eyes Normal Conjunctiva - Right: Normal, Left: Normal. Sclera - Right: Normal, Left: Normal.   Nasopharynx Normal Lips/teeth/gums - Normal.   Neck Exam Normal Inspection - Normal.   Respiratory Normal Inspection - Normal.   Cardiovascular Normal Regular rate and rhythm. No murmurs, gallops, or rubs. Vascular Normal Pulses - Brachial: Normal.   Abdomen * Obese. Skin Normal Inspection - Normal.   Musculoskeletal Normal Hands/Wrist - Right: Normal, Left: Normal.   Extremity Normal No edema. Neurological Normal Fine motor skills - Normal.   Psychiatric Normal Orientation - Oriented to time, place, person & situation. Appropriate mood and affect. Patient Education  # Patient Education   1.  Colon Polyps: Care Instructions       Immunizations Entered by History  Date Immunization   4/17/2021 12:00:00 AM SARS-COV-2 (COVID-19) vaccine, mRNA, spike protein, LNP, preservative free, 30 mcg/0.3mL dose   3/26/2021 12:00:00 AM SARS-COV-2 (COVID-19) vaccine, mRNA, spike protein, LNP, preservative free, 30 mcg/0.3mL dose   10/10/2020 12:00:00 AM Influenza, injectable, quadrivalent, preservative free   10/12/2019 8:54:46 AM Influenza, injectable, quadrivalent, preservative free   3/23/2019 12:00:00 AM Shingrix   10/29/2018 12:00:00 AM Flu (3 yrs or older)   3/23/2019 12:00:00 AM Zostavax   1/21/2019 12:00:00 AM zoster recombinant   10/7/2017 12:00:00 AM Flu (3 yrs or older)   10/8/2016 10:03:19 AM Flu (3 yrs or older)   10/1/2015 12:00:00 AM Flu (3 yrs or older)   10/6/2014 12:00:00 AM Flu (3 yrs or older)   10/21/2013 12:00:00 AM Zoster   10/21/2013 12:00:00 AM pneumo (2 yrs or older) (PPV23)       Active Patient Care Team Members  Name Contact Agency Type Support Role Relationship Active Date Inactive Date Specialty   Jacob Green   Patient provider PCP   [address]   encounter provider    Gastroenterology   Fitz Mott   encounter provider    Pulmonary Medicine   Jasmine Brandon   encounter provider    Dietician   Patient questioned and examined

## 2022-12-06 ENCOUNTER — HOSPITAL ENCOUNTER (OUTPATIENT)
Age: 72
Setting detail: OUTPATIENT SURGERY
Discharge: HOME | End: 2022-12-06
Attending: INTERNAL MEDICINE | Admitting: INTERNAL MEDICINE
Payer: MEDICARE

## 2022-12-06 VITALS
DIASTOLIC BLOOD PRESSURE: 66 MMHG | OXYGEN SATURATION: 98 % | HEIGHT: 65 IN | SYSTOLIC BLOOD PRESSURE: 138 MMHG | BODY MASS INDEX: 43.57 KG/M2 | TEMPERATURE: 97.5 F | WEIGHT: 261.5 LBS | HEART RATE: 70 BPM | RESPIRATION RATE: 16 BRPM

## 2022-12-06 PROCEDURE — 77030040361 HC SLV COMPR DVT MDII -B: Performed by: INTERNAL MEDICINE

## 2022-12-06 PROCEDURE — 74011250636 HC RX REV CODE- 250/636: Performed by: INTERNAL MEDICINE

## 2022-12-06 PROCEDURE — 99153 MOD SED SAME PHYS/QHP EA: CPT | Performed by: INTERNAL MEDICINE

## 2022-12-06 PROCEDURE — 76040000007: Performed by: INTERNAL MEDICINE

## 2022-12-06 PROCEDURE — 2709999900 HC NON-CHARGEABLE SUPPLY: Performed by: INTERNAL MEDICINE

## 2022-12-06 PROCEDURE — 77030013992 HC SNR POLYP ENDOSC BSC -B: Performed by: INTERNAL MEDICINE

## 2022-12-06 PROCEDURE — G0500 MOD SEDAT ENDO SERVICE >5YRS: HCPCS | Performed by: INTERNAL MEDICINE

## 2022-12-06 PROCEDURE — 88305 TISSUE EXAM BY PATHOLOGIST: CPT

## 2022-12-06 RX ORDER — MIDAZOLAM HYDROCHLORIDE 1 MG/ML
.25-5 INJECTION, SOLUTION INTRAMUSCULAR; INTRAVENOUS
Status: DISCONTINUED | OUTPATIENT
Start: 2022-12-06 | End: 2022-12-06 | Stop reason: HOSPADM

## 2022-12-06 RX ORDER — SODIUM CHLORIDE 9 MG/ML
1000 INJECTION, SOLUTION INTRAVENOUS CONTINUOUS
Status: DISCONTINUED | OUTPATIENT
Start: 2022-12-06 | End: 2022-12-06 | Stop reason: HOSPADM

## 2022-12-06 RX ORDER — NALOXONE HYDROCHLORIDE 0.4 MG/ML
0.4 INJECTION, SOLUTION INTRAMUSCULAR; INTRAVENOUS; SUBCUTANEOUS
Status: DISCONTINUED | OUTPATIENT
Start: 2022-12-06 | End: 2022-12-06 | Stop reason: HOSPADM

## 2022-12-06 RX ORDER — FLUMAZENIL 0.1 MG/ML
0.2 INJECTION INTRAVENOUS
Status: DISCONTINUED | OUTPATIENT
Start: 2022-12-06 | End: 2022-12-06 | Stop reason: HOSPADM

## 2022-12-06 RX ORDER — FENTANYL CITRATE 50 UG/ML
100 INJECTION, SOLUTION INTRAMUSCULAR; INTRAVENOUS
Status: DISCONTINUED | OUTPATIENT
Start: 2022-12-06 | End: 2022-12-06 | Stop reason: HOSPADM

## 2022-12-06 NOTE — PROCEDURES
[facility]  Colonoscopy Procedure Report  _______________________________________________________  Patient: Alley Ang                                        Attending Physician: Marine Mendoza MD    Patient ID: [id number]                                    Referring Physician: Bhupinder Wong MD    Exam Date: 12/6/2022      Introduction: A  81 y.o. female patient, presents for inpatient Colonoscopy    Indications: Pt of Dr. Medina Johnson, here for 3yr Recall, for surveillance of colonic adenomas, last addressed in 2019. *Last colonoscopy was done on 4/23/2019 by Dr. Don Claros, history of adenomas: 1) One cecal polyp removed with snare (Tubular Adenoma). 2) One ascending polyp removed with snare (Tubular Adenoma). 3) One transverse colon polyp removed with cold biopsy (Tubular Adenoma). 4) Diverticulosis. 5) Internal hemorrhoids. 3yr Recall recommended. *C-scope History (of records that we have on file):  2006, hx of polyps - Dr. Macario Diehl: Negative exam, 7yr Recall  2016, hx of polyps - Dr. Lizzy Lora: 2x Tubular Adenomas, 3yr Recall  2019, hx of adenomas - Dr. Lizzy Lora: 2x Tubular Adenomas, 1x HP, 3yr Recall. No FHx of colon cancer. Asymptomatic of GI complaints. BM: 2/day with Miralax. Consent: The benefits, risks, and alternatives to the procedure were discussed and informed consent was obtained from the patient. Preparation: EKG, pulse, pulse oximetry and blood pressure were monitored throughout the procedure. ASA Classification: Class III- . The heart is an S1-S2 and regular heart rate and rhythm. Lungs are clear to auscultation and percussion. Abdomen is soft, nondistended, and nontender. Mental Status: awake, alert, and oriented to person, place, and time    Medications:  Fentanyl 100 mcg IV before procedure. Versed 5 mg IV throughout the procedure. Rectal Exam: Normal Rectal Exam. No Blood. Pathology Specimens:  1    Procedure:   The colonoscope was passed with great difficulty through the anus under direct visualization and advanced to the cecum. The patient required positioning on the back and external counter pressure to aid in the passage of the scope. The scope was withdrawn and the mucosa was carefully examined. The quality of the preparation was very good. The views were excellent. The patient's toleration of the procedure was excellent. The exam was done twice to the cecum. Total time is 34 minutes and withdrawal time is 19 minutes. Findings:    Rectum:   Medium sized internal hemorrhoids. Sigmoid:   Long and loopy sigmoid  Descending Colon:   Normal   Transverse Colon:   Mild transverse colon diverticulosis. 5 mm sessile polyp, 4 mm sessile smooth polyp in the transverse colon all cold snared. Ascending Colon:   Normal  Cecum:   Normal  Terminal Ileum:   Not entered. Unplanned Events: There were no unplanned events. Estimated Blood Loss: Negligible. IMPLANTS: * No implants in log *  Impressions: Medium sized internal hemorrhoids. Very long, loopy and redundant colon. Mild transverse colon diverticulosis. 5 mm sessile polyp and 4 mm sessile smooth polyp in the transverse colon cold snared. Normal Mucosa. No blood, or AVM found. Complications: None; patient tolerated the procedure well. Recommendations:  Discharge home when standard parameters are met. Resume a high fiber diet. Resume own medications. Avoid all NSAID's for 5 days  Colonoscopy recommendation in 8 years.   Take Miralax and/ or Colace 100 mg on regular basis if constipated    Procedure Codes:    John Self [[id number]]    Endoscope Information:  Model Number(s)    [id number]   Assistant: None  Signed By: Shorty Perez MD Date: 12/6/2022

## 2022-12-06 NOTE — DISCHARGE INSTRUCTIONS
Jaimie Mcdaniel  [id number]  8/14/1956    COLON DISCHARGE INSTRUCTIONS    Discomfort:  Redness at IV site- apply warm compress to area; if redness or soreness persist- contact your physician  There may be a slight amount of blood passed from the rectum  Gaseous discomfort- walking, belching will help relieve any discomfort  You may not operate a vehicle til the next day. You may not engage in an occupation involving machinery or appliances til the next day. You may not drink alcoholic beverages til the next day. DIET:   High fiber diet. ACTIVITY:  You may not  resume your normal daily activities til the next day. it is recommended that you spend the remainder of the day resting -  avoid any strenuous activity. CALL M.D.  IF ANY SIGN OF:   Increasing pain, nausea, vomiting  Abdominal distension (swelling)  New increased bleeding (oral or rectal)  Fever (chills)  Pain in chest area  Bloody discharge from nose or mouth  Shortness of breath    You may not take any Advil, Aspirin, Ibuprofen, Motrin, Aleve, or Goodys for the next 5 days. ONLY Tylenol as needed for pain. Post procedure diagnosis:  INTERNAL HEMORRHOIDS; DIVERTICULOSIS; TWO POLYPS REMOVED     Follow-up Instructions: Your follow up colonoscopy will be in 8 years. Mateo Cedeño MD  December 6, 2022       DISCHARGE SUMMARY from Nurse    PATIENT INSTRUCTIONS:    After general anesthesia or intravenous sedation, for 24 hours or while taking prescription Narcotics:  Limit your activities  Do not drive and operate hazardous machinery  Do not make important personal or business decisions  Do  not drink alcoholic beverages  If you have not urinated within 8 hours after discharge, please contact your surgeon on call.     Report the following to your surgeon:  Excessive pain, swelling, redness or odor of or around the surgical area  Temperature over 100.5  Nausea and vomiting lasting longer than 4 hours or if unable to take medications  Any signs of decreased circulation or nerve impairment to extremity: change in color, persistent  numbness, tingling, coldness or increase pain  Any questions    What to do at Home:  Recommended activity: Activity as tolerated and no driving for today. If you experience any of the following symptoms as above, please follow up with Dr. Ilene Shelton. *  Please give a list of your current medications to your Primary Care Provider. *  Please update this list whenever your medications are discontinued, doses are      changed, or new medications (including over-the-counter products) are added. *  Please carry medication information at all times in case of emergency situations. These are general instructions for a healthy lifestyle:    No smoking/ No tobacco products/ Avoid exposure to second hand smoke  Surgeon General's Warning:  Quitting smoking now greatly reduces serious risk to your health. Obesity, smoking, and sedentary lifestyle greatly increases your risk for illness    A healthy diet, regular physical exercise & weight monitoring are important for maintaining a healthy lifestyle    You may be retaining fluid if you have a history of heart failure or if you experience any of the following symptoms:  Weight gain of 3 pounds or more overnight or 5 pounds in a week, increased swelling in our hands or feet or shortness of breath while lying flat in bed. Please call your doctor as soon as you notice any of these symptoms; do not wait until your next office visit. The discharge information has been reviewed with the patient and caregiver. The patient and caregiver verbalized understanding. Discharge medications reviewed with the patient and caregiver and appropriate educational materials and side effects teaching were provided.   ___________________________________________________________________________________________________________________________________  Patient armband removed and shredded.

## 2024-12-11 NOTE — Clinical Note
TRANSFER - OUT REPORT:     Verbal report given to: rn.     Report consisted of patient's Situation, Background, Assessment and   Recommendations(SBAR). Opportunity for questions and clarification was provided. Patient transported with a Registered Nurse and [address] [place] [address] / [employer] [facility]. Patient transported to: care unit, 2. [FreeTextEntry3] : I personally saw and examined Mr. CRYSTAL ALEXANDRA in detail this visit today. I personally reviewed the HPI, PMH, FH, SH, ROS and medications/allergies. I have spoken to SUDHEER Li regarding the history and have personally determined the assessment and plan of care, and documented this myself. I was present and participated in all key portions of the encounter and all procedures noted above. I have made changes in the body of the note where appropriate.   Attesting Faculty: See Attending Signature Below

## (undated) DEVICE — PACK PROCEDURE SURG CATH CUST

## (undated) DEVICE — CATHETER GUID 5FR L100CM DIA0.058IN NYL SHFT JR5.0 W/O SIDE

## (undated) DEVICE — STOPCOCK TRNSDUC 500PSI 3 W ROT M LUER LT BLU OFF HNDL R

## (undated) DEVICE — TUBING PRSS MON L24IN PVC RIG NONEXPANDING M TO FEM CONN

## (undated) DEVICE — SINGLE PORT MANIFOLD: Brand: NEPTUNE 2

## (undated) DEVICE — SPONGE GZ W4XL4IN COT 12 PLY TYP VII WVN C FLD DSGN

## (undated) DEVICE — NDL PRT INJ NSAF BLNT 18GX1.5 --

## (undated) DEVICE — BAND RADIAL COMPR ARTERY 27CM -- LNG BX/10

## (undated) DEVICE — Device

## (undated) DEVICE — TORCON NB, ADVANTAGE CATHETER: Brand: TORCON NB

## (undated) DEVICE — TRAP SPEC POLYP REM STRNR CLN DSGN MAGNIFYING WIND DISP

## (undated) DEVICE — SYR 3ML LL TIP 1/10ML GRAD --

## (undated) DEVICE — SENSOR PLSE OXMTR AD CBL L36IN ADH FRM FIT SPO2 DISP

## (undated) DEVICE — GUIDEWIRE VASC L260CM DIA0.035IN TIP L3MM STD EXCHG PTFE J

## (undated) DEVICE — DRAPE,ANGIO,BRACH,STERILE,38X44: Brand: MEDLINE

## (undated) DEVICE — GUIDEWIRE VASC L260CM DIA0.035IN RAD 3MM J TIP L7CM PTFE

## (undated) DEVICE — KENDALL RADIOLUCENT FOAM MONITORING ELECTRODE RECTANGULAR SHAPE: Brand: KENDALL

## (undated) DEVICE — SYRINGE 50ML E/T

## (undated) DEVICE — CATHETER IV 22GA L1IN BLU POLYUR STR HUB RADPQ PROTCT +

## (undated) DEVICE — GLIDESHEATH SLENDER STAINLESS STEEL KIT: Brand: GLIDESHEATH SLENDER

## (undated) DEVICE — SYR 5ML 1/5 GRAD LL NSAF LF --

## (undated) DEVICE — CATH DIAG FR4 EXPO 5FRX100CM -- EXPO

## (undated) DEVICE — SWAN-GANZ POLYMER BLEND TRUE SIZE C-TIP CONTROLCATH TD CATHETER: Brand: SWAN-GANZ CONTROLCATH TRUE SIZE

## (undated) DEVICE — COPILOT BLEEDBACK CONTROL VALVE: Brand: COPILOT

## (undated) DEVICE — MAJ-1414 SINGLE USE ADPATER BIOPSY VALV: Brand: SINGLE USE ADAPTOR BIOPSY VALVE

## (undated) DEVICE — TOURNIQUET PHLEB W1XL18IN BLU FLAT RL AND BND REUSE FOR IV

## (undated) DEVICE — SOLUTION IV 500ML 0.9% SOD CHL FLX CONT

## (undated) DEVICE — TUBING, SUCTION, 1/4" X 12', STRAIGHT: Brand: MEDLINE

## (undated) DEVICE — SHIELD RAD 14X16 IN W/ SCOOP ABSORBER

## (undated) DEVICE — PROCEDURE KIT FLUID MGMT 10 FR CUST MAINFOLD

## (undated) DEVICE — WRISTBAND ID AD W2.5XL9.5CM RED VYN ADH CLSR UNI-PRINT

## (undated) DEVICE — NDL FLTR TIP 5 MIC 18GX1.5IN --

## (undated) DEVICE — RADIFOCUS GLIDEWIRE: Brand: GLIDEWIRE

## (undated) DEVICE — CATHETER GUID 5FR L100CM DIA0.058IN NYL SHFT AL0.75 W/O

## (undated) DEVICE — GARMENT,MEDLINE,DVT,INT,CALF,MED, GEN2: Brand: MEDLINE

## (undated) DEVICE — CANNULA CUSH AD W/ 14FT TBG

## (undated) DEVICE — CATH DIAG F5 AR I MOD 100CM -- INFINITI - ORDER AS EA

## (undated) DEVICE — SET ADMIN 16ML TBNG L100IN 2 Y INJ SITE IV PIGGY BK DISP (ORDER IN MULIPLES OF 48)

## (undated) DEVICE — SPONGE GZ W4XL4IN RAYON POLY 4 PLY NONWOVEN FASTER WICKING

## (undated) DEVICE — CATHETER ANGIO NTR 0.045 INX5 FRX100 CM THRULUMEN EXPO

## (undated) DEVICE — SNARE POLYP MIC OVL 13X2.4MM -- CAPTIVATOR BX/10

## (undated) DEVICE — PRESSURE MONITORING SET: Brand: TRUWAVE

## (undated) DEVICE — CATH SUC CTRL PRT TRIFLO 14FR --